# Patient Record
Sex: FEMALE | Race: ASIAN | NOT HISPANIC OR LATINO | URBAN - METROPOLITAN AREA
[De-identification: names, ages, dates, MRNs, and addresses within clinical notes are randomized per-mention and may not be internally consistent; named-entity substitution may affect disease eponyms.]

---
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
## 2020-01-03 NOTE — PROGRESS NOTE BEHAVIORAL HEALTH - MUSCLE TONE / STRENGTH
Pt informed  
Normal muscle tone/strength

## 2020-05-12 NOTE — ED PROVIDER NOTE - EYES NEGATIVE STATEMENT, MLM
PT AAO x4. Monitor placed and verified with monitor tech. Blood infusion set up. Will continue to monitor.   no discharge, no irritation, no pain, no redness, and no visual changes.

## 2020-09-01 NOTE — ED BEHAVIORAL HEALTH ASSESSMENT NOTE - NS ED BHA MED ROS NEUROLOGICAL
What Type Of Note Output Would You Prefer (Optional)?: Standard Output How Severe Is Your Acne?: mild Is This A New Presentation, Or A Follow-Up?: Acne No complaints

## 2021-01-01 NOTE — PROGRESS NOTE BEHAVIORAL HEALTH - NS ED BHA AXIS I PRIMARY CODE FT
06/28/21 0720 06/28/21 0721 06/28/21 0722   Vitals   Heart Rate 137 140 137   Resp 28 28 36   SpO2 91 % 90 % 89 %   O2 Device  --   --  None (Room air)      06/28/21 0723 06/28/21 0724   Vitals   Heart Rate 133 143   Resp 31 38   SpO2 89 % 90 %   O2 Device None (Room air)  --
F31.4

## 2021-01-29 NOTE — ED BEHAVIORAL HEALTH ASSESSMENT NOTE - DOMICILED WITH
Patient : Claudia Espinoza Age: 86 year old Sex: female   MRN: [id number] Encounter Date: 1/29/2021      History     Chief Complaint   Patient presents with   • Hypotension     HPI:  Leda Cadena RN  1/29/2021 16:46       Pt presents to the ED via EMS from [facility] for evaluation of hypotension (70's/50's). The patient reports feeling \"more weak and tired for a couple days\" but denies any other sx including CP, SOB, fever, or other sx. Daughter reports pt has a harrison since 12/31 for a UTI and urinary retention which she was d/t have until her f/u with urology today.     Pt arrives on EMS cot, A&Ox2 appearing in no acute distress.        This is an 86-year-old female who arrives from a urology clinic in [place], [place] for evaluation of weakness and hypotension.  Patient had been hospitalized here at [facility] with UTI and urinary retention and was discharged on December 31, 2020 to a rehab facility and subsequently now is in nursing home.  She reports about a 2 day history of weakness and her daughter arrives and reports that the patient has had a lack of appetite as well.  Only new medication that the daughter knows of is an antidepressant.  She denies any abdominal pain, fever or chills, nausea or vomiting, diarrhea or constipation.  Was found to be hypotensive at the clinic today with blood pressure in the 70s and the physician wanted to refer her to [facility] the closest hospital but family requested she be transferred here.  EMS gave roughly 200-150 mL of normal saline prior to arrival and the patient arrives with a blood pressure of 88 systolic.    ALLERGIES:   Allergen Reactions   • Altace Cough   • Aspirin Other (See Comments)     Not to take b/c of asthma     • Morphine SWELLING and Nausea & Vomiting   • Penicillins RASH   • Tetracyclines & Related RASH   • Tuna   (Food) GI UPSET       Current Discharge Medication List      Prior to Admission Medications     Details   hydroCORTisone (CORTIZONE) 1 % cream Apply topically 2 times daily. Rash or other nonspecific skin eruption      furosemide (LASIX) 20 MG tablet Take 20 mg by mouth daily.      sertraline (ZOLOFT) 50 MG tablet Take 50 mg by mouth daily.      warfarin (COUMADIN) 1 MG tablet (warfarin) Take 1 mg by mouth daily. Once a day on Tuesday, Thursday      warfarin (COUMADIN) 3 MG tablet Take 1/2 tablet (=1.5 mg) by mouth on Tuesdays and Thursdays and 1 tablet (=3 mg) by mouth all remaining days of the week  Qty: 100 tablet, Refills: 3      lisinopril-hydroCHLOROthiazide (ZESTORETIC) 20-25 MG per tablet Take 1 tablet by mouth daily. Do not start before January 7, 2021. Resume if BP becomes uncontroll  Qty: 30 tablet, Refills: 11      simvastatin (ZOCOR) 40 MG tablet Take 1 tablet by mouth nightly.  Qty: 30 tablet, Refills: 6      NIFEdipine XL (PROCARDIA XL) 60 MG 24 hr tablet Take 1 tablet by mouth daily.  Qty: 90 tablet, Refills: 1      fluticasone-salmeterol (ADVAIR DISKUS) 250-50 MCG/DOSE inhaler INHALE 1 PUFF INTO THE LUNGS TWICE DAILY  Qty: 60 each, Refills: 11      methIMAzole (TAPAZOLE) 5 MG tablet Take 1/2 tablet 2 times a week on Monday and Friday  Qty: 12 tablet, Refills: 3      metFORMIN (GLUCOPHAGE) 500 MG tablet Take 1 tablet by mouth 2 times daily (with meals).  Qty: 60 tablet, Refills: 11      triamcinolone (ARISTOCORT) 0.1 % cream Apply twice a day to affected areas of rash.  Qty: 453.6 g, Refills: 2      hydrOXYzine (ATARAX) 25 MG tablet Take 1 tablet by mouth 3 times daily as needed for Itching.  Qty: 30 tablet, Refills: 1      acetaminophen (TYLENOL) 500 MG tablet Take 2 tablets by mouth every 6 hours as needed for Pain.  Qty: 30 tablet, Refills: 0      cholecalciferol (VITAMIN D3) 1000 UNITS tablet Take 1,000 Units by mouth daily.      albuterol (VENTOLIN HFA) 108 (90 Base) MCG/ACT inhaler Inhale 2 puffs 4 times daily.  Qty: 3 Inhaler, Refills: 0             Past Medical History:   Diagnosis Date    • Acute sinusitis     LEFT   • Arthritis of multiple sites    • Asthma    • Blood clot associated with vein wall inflammation    • Carcinoma of left breast (CMS/HCC)     Stage I   • Cataract    • Diabetes mellitus (CMS/[facility])    • DJD (degenerative joint disease)    • DVT (deep venous thrombosis) (CMS/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)    • Pneumonia    • Renal failure    • Tobacco abuse, in remission    • Umbilical hernia without obstruction and without gangrene 3/30/2017   • Wears dentures     upper   • Wears prescription eyeglasses        Past Surgical History:   Procedure Laterality Date   • Appendectomy     • Axillary artery - brachial artery bypass graft  2009    Left Axillar ulnary artery bypass   • Dexa bone density axial skeleton  03/10/2010   • Mastectomy Left    • Removal gallbladder     • Repair incisional hernia,aleida  2017   • Tonsillectomy and adenoidectomy  1939   • Total knee replacement Right     Right   • Upper extremity angiograms/possible pta/possible stent Left 2017    by Dr. Lopez at [facility]   • Xray mammogram screening bilat  2011       Family History   Problem Relation Age of Onset   • Osteoarthritis Mother    • Hypertension Mother    • Thyroid Mother    • Coronary Artery Disease Father         sudden cardiac death at 38   • Heart disease Father    • Diabetes Brother    • Kidney disease Brother        Social History     Tobacco Use   • Smoking status: Former Smoker     Quit date: 1991     Years since quittin.0   • Smokeless tobacco: Never Used   Substance Use Topics   • Alcohol use: No     Alcohol/week: 0.0 standard drinks     Frequency: Never     Drinks per session: 1 or 2     Binge frequency: Never   • Drug use: No       Review of Systems   Constitutional: Positive for appetite change. Negative for chills and  fever.   HENT: Negative for congestion, ear pain and sore throat.    Eyes: Negative for pain and redness.   Respiratory: Negative for cough and shortness of breath.    Cardiovascular: Negative for chest pain, palpitations and leg swelling.   Gastrointestinal: Negative for abdominal pain, constipation, diarrhea, nausea and vomiting.   Genitourinary: Negative for difficulty urinating, dysuria, flank pain, frequency, hematuria and urgency.   Musculoskeletal: Negative for back pain and neck pain.   Skin: Negative for color change, pallor and rash.   Neurological: Positive for weakness. Negative for dizziness, light-headedness, numbness and headaches.   Hematological: Negative for adenopathy. Does not bruise/bleed easily.   Psychiatric/Behavioral: Negative for confusion and decreased concentration. The patient is not nervous/anxious.        Physical Exam     ED Triage Vitals   ED Triage Vitals Group      Temp 01/29/21 1644 97.6 °F (36.4 °C)      Heart Rate 01/29/21 1644 70      Resp 01/29/21 1644 17      BP 01/29/21 1644 (!) 88/49      SpO2 01/29/21 1632 93 %      EtCO2 mmHg --       Height 01/29/21 1644 5' 2\" (1.575 m)      Weight 01/29/21 1644 223 lb 15.8 oz (101.6 kg)      Weight Scale Used 01/29/21 1644 ED Estimate      BMI (Calculated) 01/29/21 1644 40.97      IBW/kg (Calculated) 01/29/21 1644 50.1       Physical Exam  Vitals signs and nursing note reviewed.   Constitutional:       General: She is not in acute distress.     Appearance: She is not ill-appearing, toxic-appearing or diaphoretic.   HENT:      Head: Normocephalic.      Right Ear: External ear normal.      Left Ear: External ear normal.      Mouth/Throat:      Mouth: Mucous membranes are moist.   Eyes:      Extraocular Movements: Extraocular movements intact.      Pupils: Pupils are equal, round, and reactive to light.   Neck:      Musculoskeletal: Normal range of motion.   Cardiovascular:      Rate and Rhythm: Normal rate and regular rhythm.      Pulses:  Normal pulses.      Heart sounds: Normal heart sounds.   Pulmonary:      Effort: Pulmonary effort is normal. No respiratory distress.      Breath sounds: Normal breath sounds.   Abdominal:      General: Bowel sounds are normal.      Palpations: Abdomen is soft.      Tenderness: There is no abdominal tenderness.   Genitourinary:     Comments: Liu catheter in place  Musculoskeletal: Normal range of motion.         General: No tenderness.      Right lower leg: Edema present.      Left lower leg: Edema present.   Skin:     General: Skin is warm and dry.      Coloration: Skin is not pale.   Neurological:      General: No focal deficit present.      Mental Status: She is alert and oriented to person, place, and time. Mental status is at baseline.   Psychiatric:         Attention and Perception: Attention and perception normal.         Mood and Affect: Affect normal. Mood is depressed.         Speech: Speech normal.         Behavior: Behavior normal. Behavior is cooperative.         Thought Content: Thought content normal.           ED Course     Procedures    MDM:  This is an elderly female who presents with hypotension, weakness with history of Liu catheter in place prior history of UTI.  Family reports that they have been treating her blood pressure with diuretics and Ace inhibitors as of this could be a factor in her lower blood pressure reading as well as the potential for dehydration from diuretic therapy.  She will be screen for sepsis as well with the most likely source being a recurrent urinary tract infection.  IV fluids will be given to correct the hypotension.      Labs:   Results for orders placed or performed during the hospital encounter of 01/29/21   Comprehensive Metabolic Panel   Result Value    Fasting Status     Sodium 135    Potassium 4.2    Chloride 99    Carbon Dioxide 25    Anion Gap 15    Glucose 121 (H)    BUN 45 (H)    Creatinine 1.68 (H)    Glomerular Filtration Rate 27 (L)     Comment: eGFR  15 - 29 mL/min/1.73m2 = Severe decrease in kidney function. Stage 4 CKD (chronic kidney disease), or severe kidney disease.    BUN/ Creatinine Ratio 27 (H)    Calcium 10.1    Bilirubin, Total 0.8    GOT/AST 11    GPT/ALT 15    Alkaline Phosphatase 97    Albumin 2.8 (L)    Protein, Total 7.1    Globulin 4.3 (H)    A/G Ratio 0.7 (L)   Prothrombin Time   Result Value    Prothrombin Time 42.1 (H)    INR 4.2     Comment: INR Therapeutic Range: 2.0 to 3.0 (2.5 to 3.5 recommended for recurrent thrombotic episodes and mechanical prosthetic heart valves.)   Partial Thromboplastin Time   Result Value    PTT 54 (H)   Troponin I Ultra Sensitive   Result Value    Troponin I, Ultra Sensitive <0.02   NT proBNP   Result Value    NT-proBNP 1,227 (H)   Urinalysis & Reflex Microscopy With Culture If Indicated   Result Value    COLOR, URINALYSIS Yellow    APPEARANCE, URINALYSIS Cloudy    GLUCOSE, URINALYSIS Negative    BILIRUBIN, URINALYSIS Negative    KETONES, URINALYSIS Trace (A)    SPECIFIC GRAVITY, URINALYSIS 1.020    OCCULT BLOOD, URINALYSIS Trace (A)    PH, URINALYSIS 6.5    PROTEIN, URINALYSIS >300 (A)    UROBILINOGEN, URINALYSIS 1.0    NITRITE, URINALYSIS Negative    LEUKOCYTE ESTERASE, URINALYSIS Moderate (A)    SQUAMOUS EPITHELIAL, URINALYSIS 1 to 5    ERYTHROCYTES, URINALYSIS 11 to 25 (A)    LEUKOCYTES, URINALYSIS >100 (A)    BACTERIA, URINALYSIS Large (A)    HYALINE CASTS, URINALYSIS 1 to 5    TRANSITIONAL EPITHELIALS 1 to 5    YEAST Present (A)    HYPHAE Present (A)   CBC with Automated Differential (performable only)   Result Value    WBC 13.2 (H)    RBC 4.24    HGB 11.3 (L)    HCT 36.3    MCV 85.6    MCH 26.7    MCHC 31.1 (L)    RDW-CV 16.7 (H)    RDW-SD 51.5 (H)        NRBC 0    Neutrophil, Percent 82    Lymphocytes, Percent 9    Mono, Percent 7    Eosinophils, Percent 0    Basophils, Percent 1    Immature Granulocytes 1    Absolute Neutrophils 11.0 (H)    Absolute Lymphocytes 1.1    Absolute Monocytes 0.9     Absolute Eosinophils  0.0    Absolute Basophils 0.1    Absolute Immmature Granulocytes 0.1   BLOOD GAS, VENOUS -POINT OF CARE   Result Value    PH, VENOUS - POINT OF CARE 7.48 (H)    PCO2, VENOUS - POINT OF CARE 28 (L)    PO2, VENOUS - POINT OF CARE 67 (H)    HCO3, VENOUS - POINT OF CARE 21 (L)    BASE EXCESS / DEFICIT, VENOUS - POINT OF CARE -2    O2 SATURATION, VENOUS - POINT OF CARE 95 (H)    TCO2 - POINT OF CARE 22   LACTIC ACID VENOUS POINT OF CARE   Result Value    LACTIC ACID, VENOUS - POINT OF CARE 2.0 (H)   TROPONIN I  POINT OF CARE   Result Value    TROPONIN I - POINT OF CARE <0.10      Patient does meet severe sepsis criteria with a lactic acid of 2 an elevated white blood cell count of 13,200. Other labs of concern include greater than 100 white blood cells in the urine and her creatinine level is elevated beyond her baseline at 1.68 with baseline around 1.    Radiology:   XR Chest AP or PA   Final Result   IMPRESSION:    Mild basilar patchy opacities/infiltrate, mildly improved.         Images reviewed by Emergency Physician, this does not appear to be in acute finding as has been present on previous chest x-rays and appears improved.      ECG Interpretation  ECG done at 4:49 pm  EKG Interpretation  Rate: 69  Rhythm: normal sinus rhythm, inferior infarct of undetermined age   Abnormality: yes    EKG tracing interpreted by ED physician    Read at: 4:55 pm  NOT A STEMI     Medications/Fluids ordered in ED  Medications   sodium chloride 0.9 % flush bag 25 mL (has no administration in time range)   sodium chloride (PF) 0.9 % injection 2 mL (has no administration in time range)   cefepime (MAXIPIME) 2,000 mg in sodium chloride 0.9 % 100 mL IVPB (has no administration in time range)     Followed by   cefepime (MAXIPIME) 1,000 mg in sodium chloride 0.9 % 100 mL IVPB (has no administration in time range)   sodium chloride (NORMAL SALINE) 0.9 % bolus 1,000 mL (0 mLs Intravenous Paused 1/29/21 1846)     BP 94  systolic prior to admission with IV fluid administration.    Diagnosis:  1. Urinary tract infection associated with indwelling urethral catheter, initial encounter (CMS/[facility])    2. Elevated serum creatinine    3. Dehydration    4. Severe sepsis (CMS/[facility])    5. Do not resuscitate      ED Course/MDM:    Clinical Impression:  The primary encounter diagnosis was Urinary tract infection associated with indwelling urethral catheter, initial encounter (CMS/[facility]). Diagnoses of Elevated serum creatinine, Dehydration, Severe sepsis (CMS/[facility]), and Do not resuscitate were also pertinent to this visit.    Disposition:  Admit 1/29/2021  6:59 PM  Telemetry Bed?: No  Patient Class: Inpatient [1]  Level of Care: Acute [1]  Admission Diagnosis: Severe sepsis (CMS/[facility]) [[id number]]  Admitting Physician: RAMESH MILLER [[id number]]  Is this a telephone or verbal order?: This is a telephone order from the admitting physician     Srikanth Porras MD  01/29/21 [id number]     Family

## 2022-05-13 NOTE — ED BEHAVIORAL HEALTH ASSESSMENT NOTE - DANGER TO SELF; MENTAL ILLNESS EXPECTED TO RESPOND TO INPATIENT CARE
Lives in 3 family house with wife and daughter, lives on 2nd floor with 10 steps to negotiate, +tub shower/children/spouse
Suicidal ideation with plan/means

## 2023-03-30 NOTE — ED ADULT NURSE NOTE - RN DISCHARGE SIGNATURE
Family called and cancelled [facility] appt. [facility] will jevon back once pt is discharged.
19-Apr-2018

## 2024-04-25 NOTE — PATIENT PROFILE BEHAVIORAL HEALTH - CENTRAL VENOUS CATHETER
no [>50% of Time Spent on Counseling and Coordination of Care for  ___] : Greater than 50% of the encounter time was spent on counseling and coordination of care for [unfilled] [Time Spent: ___ minutes] : I have spent [unfilled] minutes of time on the encounter.

## 2024-05-28 NOTE — ED BEHAVIORAL HEALTH ASSESSMENT NOTE - PRIMARY DX
Pt reports that she is doing well and denies vaginal bleeding, cramping, contractions or LOF at this time. Reports active fetal movement. Reviewed when to call OB office or present to L&D for evaluation with symptoms such as decreased fetal movement, vaginal bleeding, LOF or ctxs. Pt verbalized understanding. Denies HA, visual changes or epigastric pain.Patient states that she occasionally has vaginal pressure that comes and goes, was informed to call ob or go to OB ED if it becomes consistent.  Denies any additional complaints at time of appointment. Next OB appointment scheduled for 05/31/2024    There were no vitals filed for this visit.      Mood disorder

## 2024-07-23 NOTE — PROGRESS NOTE BEHAVIORAL HEALTH - THOUGHT CONTENT
[Dear  ___] : Dear  [unfilled],
[Consult Letter:] : I had the pleasure of evaluating your patient, [unfilled].
[Please see my note below.] : Please see my note below.
[Consult Closing:] : Thank you very much for allowing me to participate in the care of this patient.  If you have any questions, please do not hesitate to contact me.
[Sincerely,] : Sincerely,
[FreeTextEntry3] : Glenn Cooney MD Pediatric Orthopaedics [address] [address] Phone: [phone] Fax: [phone]
Suicidality
Unremarkable
Suicidality

## 2024-09-24 NOTE — ED BEHAVIORAL HEALTH ASSESSMENT NOTE - NS ED BHA REVIEW OF ED CHART AVAILABLE LABS REVIEWED
Pt brought in by brother because girlfriend called him regarding unsteady gait leading to falls, pt favoring the left side x couple days . Left sided facial droop noted. Symptoms started more than two days ago per family, but patient has been refusing to go to ER     Triage Assessment (Adult)       Row Name 09/23/24 3615          Respiratory WDL    Respiratory WDL WDL        Cardiac WDL    Cardiac WDL WDL        Cognitive/Neuro/Behavioral WDL    Cognitive/Neuro/Behavioral WDL X  left facial droop                     
Yes
